# Patient Record
Sex: FEMALE | Race: WHITE
[De-identification: names, ages, dates, MRNs, and addresses within clinical notes are randomized per-mention and may not be internally consistent; named-entity substitution may affect disease eponyms.]

---

## 2022-08-05 PROBLEM — Z00.00 ENCOUNTER FOR PREVENTIVE HEALTH EXAMINATION: Status: ACTIVE | Noted: 2022-08-05

## 2022-08-08 ENCOUNTER — APPOINTMENT (OUTPATIENT)
Dept: OTOLARYNGOLOGY | Facility: CLINIC | Age: 87
End: 2022-08-08

## 2022-08-08 VITALS
HEART RATE: 77 BPM | WEIGHT: 107.8 LBS | TEMPERATURE: 97.2 F | DIASTOLIC BLOOD PRESSURE: 82 MMHG | OXYGEN SATURATION: 98 % | HEIGHT: 62.5 IN | SYSTOLIC BLOOD PRESSURE: 120 MMHG | BODY MASS INDEX: 19.34 KG/M2

## 2022-08-08 DIAGNOSIS — R49.8 OTHER VOICE AND RESONANCE DISORDERS: ICD-10-CM

## 2022-08-08 DIAGNOSIS — R49.0 DYSPHONIA: ICD-10-CM

## 2022-08-08 DIAGNOSIS — J38.7 OTHER DISEASES OF LARYNX: ICD-10-CM

## 2022-08-08 PROCEDURE — 99205 OFFICE O/P NEW HI 60 MIN: CPT | Mod: 25

## 2022-08-08 PROCEDURE — 31579 LARYNGOSCOPY TELESCOPIC: CPT

## 2022-08-08 NOTE — HISTORY OF PRESENT ILLNESS
[de-identified] : 86 yo female who presents with dysphonia.  This started 2 years ago during the pandemic.  Unsure of what was happening when it first started.  She notes this is fairly constant at this point.  She describes her voice as unfamiliar, hoarse, raspy.  No issues chewing, eating or swallowing, other than 2 episodes when ;lainey in bed and had.  She denies any breathing issues.  Exercises without issue.  She has worked with a  when she was younger.  Has not worked with a speech pathologist.\par \par +hx of GERD and follows with GI. Previously sang professionally prior to PhD.  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  She can be on the phone for a while.  She likes to sing, but doesn't feel that she can.

## 2022-08-08 NOTE — PHYSICAL EXAM
[Midline] : trachea located in midline position [Normal] : no rashes [FreeTextEntry1] : weak and breathy voice.  Normal range, pitch control, normal cough

## 2022-08-08 NOTE — PROCEDURE
[de-identified] : -\par Procedure Note\par \par Pre-operative Diagnosis:  dysphonia\par Post-operative Diagnosis:  bilateral vocal fold atrophy\par Anesthesia: Topical - 1 % Lidocaine/Phenylephrine\par Procedure:  Flexible Laryngoscopy with Stroboscopy\par  \par Procedure Details:  \par The patient was placed in the sitting position.  After decongestant and anesthesia were applied the laryngoscope was passed.  The nasal cavities, nasopharynx, oropharynx, hypopharynx, and larynx were all examined.  Vocal folds were examined during respiration and phonation.  The following findings were noted:\par \par Findings:  \par Nose: Septum is midline, turbinates are normal, nasal airways patent, mucosa normal\par Nasopharynx: Adenoids normal, no masses, eustachian tube normal\par Oropharynx: Pharyngeal walls symmetric and without lesion. Tonsils/fossae symmetric\par Hypopharynx: Hypopharynx and pyriform sinuses without lesion. No masses or asymmetry.  No pooling of secretions.\par Larynx:  Epiglottis and aryepiglottic folds were sharp and crisp bilaterally.  Bilateral false vocal folds normal appearance. Airway was widely patent.\par \par Strobe Exam Ratings\par 		\par TVF Appearance:  bilateral vocal fold atrophy\par TVF Mobility:  normal\par Edema/hypertrophy:  normal\par Mucus on TVF:  normal\par Glottic Closure: +glottic gap\par Mucosal Wave: reduced\par Amplitude of Vibration: reduced\par Phase: symmetric\par Supraglottic Hyperfunction: mild supraglottic compression\par Other Findings: none\par \par Condition: Stable.  Patient tolerated procedure well.\par \par Complications: None\par

## 2022-08-08 NOTE — ASSESSMENT
[FreeTextEntry1] : 87-year-old female who presents with concern for dysphonia.  On examination today there appears to be bilateral atrophy of the vocal folds.  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  At this time I am recommending consultation with speech therapy for evaluation and management.  I also recommended voice hygiene and the handout was given for this as well.  Patient can follow-up with me in 2 to 3 months after working with speech therapy.\par \par –Voice hygiene\par – Consult speech-language pathology\par – Follow-up 2 to 3 months, sooner should symptoms worsen or fail to improve

## 2022-11-04 ENCOUNTER — APPOINTMENT (OUTPATIENT)
Dept: OTOLARYNGOLOGY | Facility: CLINIC | Age: 87
End: 2022-11-04